# Patient Record
Sex: MALE | URBAN - METROPOLITAN AREA
[De-identification: names, ages, dates, MRNs, and addresses within clinical notes are randomized per-mention and may not be internally consistent; named-entity substitution may affect disease eponyms.]

---

## 2023-10-19 ENCOUNTER — TELEPHONE (OUTPATIENT)
Dept: CARDIOLOGY CLINIC | Age: 58
End: 2023-10-19

## 2023-10-19 DIAGNOSIS — I42.1 HOCM (HYPERTROPHIC OBSTRUCTIVE CARDIOMYOPATHY) (HCC): Primary | ICD-10-CM

## 2023-10-19 DIAGNOSIS — Z82.49 FAMILY HISTORY OF HYPERTROPHIC CARDIOMYOPATHY: ICD-10-CM

## 2023-10-19 NOTE — TELEPHONE ENCOUNTER
- please call patient to schedule with [facility] for [facility] appt in January with Echo same day or prior to. Echo order placed. Thank you!           ----- Message from Ryan Lopez MD sent at 10/19/2023  8:29 AM EDT -----  Regarding: new patient - FH HCM  I just diagnosed this adrien's mom with hypertrophic CM yesterday, so he needs an appt with me with echo and EKG to screen him for HCM.     Thanks    Dane Aguilar

## 2023-11-17 ENCOUNTER — APPOINTMENT (OUTPATIENT)
Dept: CT IMAGING | Age: 58
End: 2023-11-17
Payer: COMMERCIAL

## 2023-11-17 ENCOUNTER — HOSPITAL ENCOUNTER (EMERGENCY)
Age: 58
Discharge: HOME | End: 2023-11-17
Payer: COMMERCIAL

## 2023-11-17 ENCOUNTER — APPOINTMENT (OUTPATIENT)
Dept: ULTRASOUND IMAGING | Age: 58
End: 2023-11-17
Payer: COMMERCIAL

## 2023-11-17 VITALS
DIASTOLIC BLOOD PRESSURE: 70 MMHG | RESPIRATION RATE: 16 BRPM | TEMPERATURE: 98.4 F | OXYGEN SATURATION: 96 % | SYSTOLIC BLOOD PRESSURE: 163 MMHG | HEART RATE: 98 BPM | WEIGHT: 299.3 LBS

## 2023-11-17 DIAGNOSIS — R91.1 PULMONARY NODULE LESS THAN 6 MM DETERMINED BY COMPUTED TOMOGRAPHY OF LUNG: ICD-10-CM

## 2023-11-17 DIAGNOSIS — N43.3 HYDROCELE OF TESTIS: Primary | ICD-10-CM

## 2023-11-17 DIAGNOSIS — N50.89 TESTICULAR SWELLING, RIGHT: ICD-10-CM

## 2023-11-17 LAB
ANION GAP SERPL CALCULATED.3IONS-SCNC: 13 MMOL/L (ref 3–16)
BACTERIA URNS QL MICRO: ABNORMAL /HPF
BASOPHILS # BLD: 0.1 K/UL (ref 0–0.2)
BASOPHILS NFR BLD: 0.5 %
BILIRUB UR QL STRIP.AUTO: NEGATIVE
BUN SERPL-MCNC: 16 MG/DL (ref 7–20)
CALCIUM SERPL-MCNC: 9.1 MG/DL (ref 8.3–10.6)
CHLORIDE SERPL-SCNC: 103 MMOL/L (ref 99–110)
CLARITY UR: CLEAR
CO2 SERPL-SCNC: 26 MMOL/L (ref 21–32)
COLOR UR: YELLOW
CREAT SERPL-MCNC: 1 MG/DL (ref 0.9–1.3)
DEPRECATED RDW RBC AUTO: 14.9 % (ref 12.4–15.4)
EOSINOPHIL # BLD: 0.1 K/UL (ref 0–0.6)
EOSINOPHIL NFR BLD: 0.5 %
EPI CELLS #/AREA URNS AUTO: 0 /HPF (ref 0–5)
GFR SERPLBLD CREATININE-BSD FMLA CKD-EPI: >60 ML/MIN/{1.73_M2}
GLUCOSE SERPL-MCNC: 111 MG/DL (ref 70–99)
GLUCOSE UR STRIP.AUTO-MCNC: NEGATIVE MG/DL
HCT VFR BLD AUTO: 42.7 % (ref 40.5–52.5)
HGB BLD-MCNC: 14 G/DL (ref 13.5–17.5)
HGB UR QL STRIP.AUTO: ABNORMAL
HYALINE CASTS #/AREA URNS AUTO: 0 /LPF (ref 0–8)
KETONES UR STRIP.AUTO-MCNC: NEGATIVE MG/DL
LEUKOCYTE ESTERASE UR QL STRIP.AUTO: NEGATIVE
LYMPHOCYTES # BLD: 4.8 K/UL (ref 1–5.1)
LYMPHOCYTES NFR BLD: 33.7 %
MAGNESIUM SERPL-MCNC: 2 MG/DL (ref 1.8–2.4)
MCH RBC QN AUTO: 28.8 PG (ref 26–34)
MCHC RBC AUTO-ENTMCNC: 32.8 G/DL (ref 31–36)
MCV RBC AUTO: 87.9 FL (ref 80–100)
MONOCYTES # BLD: 0.9 K/UL (ref 0–1.3)
MONOCYTES NFR BLD: 6.5 %
NEUTROPHILS # BLD: 8.5 K/UL (ref 1.7–7.7)
NEUTROPHILS NFR BLD: 58.8 %
NITRITE UR QL STRIP.AUTO: NEGATIVE
PH UR STRIP.AUTO: 7 [PH] (ref 5–8)
PLATELET # BLD AUTO: 265 K/UL (ref 135–450)
PMV BLD AUTO: 8.5 FL (ref 5–10.5)
POTASSIUM SERPL-SCNC: 3.1 MMOL/L (ref 3.5–5.1)
PROT UR STRIP.AUTO-MCNC: NEGATIVE MG/DL
RBC # BLD AUTO: 4.86 M/UL (ref 4.2–5.9)
RBC CLUMPS #/AREA URNS AUTO: 9 /HPF (ref 0–4)
SODIUM SERPL-SCNC: 142 MMOL/L (ref 136–145)
SP GR UR STRIP.AUTO: 1.01 (ref 1–1.03)
UA COMPLETE W REFLEX CULTURE PNL UR: ABNORMAL
UA DIPSTICK W REFLEX MICRO PNL UR: YES
URN SPEC COLLECT METH UR: ABNORMAL
UROBILINOGEN UR STRIP-ACNC: 1 E.U./DL
WBC # BLD AUTO: 14.4 K/UL (ref 4–11)
WBC #/AREA URNS AUTO: 0 /HPF (ref 0–5)

## 2023-11-17 PROCEDURE — 81001 URINALYSIS AUTO W/SCOPE: CPT

## 2023-11-17 PROCEDURE — 76870 US EXAM SCROTUM: CPT

## 2023-11-17 PROCEDURE — 99284 EMERGENCY DEPT VISIT MOD MDM: CPT

## 2023-11-17 PROCEDURE — 80048 BASIC METABOLIC PNL TOTAL CA: CPT

## 2023-11-17 PROCEDURE — 83735 ASSAY OF MAGNESIUM: CPT

## 2023-11-17 PROCEDURE — 87591 N.GONORRHOEAE DNA AMP PROB: CPT

## 2023-11-17 PROCEDURE — 85025 COMPLETE CBC W/AUTO DIFF WBC: CPT

## 2023-11-17 PROCEDURE — 87491 CHLMYD TRACH DNA AMP PROBE: CPT

## 2023-11-17 PROCEDURE — 74176 CT ABD & PELVIS W/O CONTRAST: CPT

## 2023-11-17 PROCEDURE — 93975 VASCULAR STUDY: CPT

## 2023-11-17 ASSESSMENT — ENCOUNTER SYMPTOMS
ABDOMINAL PAIN: 0
NAUSEA: 0
CONSTIPATION: 0
RHINORRHEA: 0
VOMITING: 0
DIARRHEA: 0
COUGH: 0
SHORTNESS OF BREATH: 0
BACK PAIN: 0
SORE THROAT: 0
EYE PAIN: 0

## 2023-11-17 ASSESSMENT — PAIN - FUNCTIONAL ASSESSMENT: PAIN_FUNCTIONAL_ASSESSMENT: NONE - DENIES PAIN

## 2023-11-17 NOTE — ED PROVIDER NOTES
[place]        Pt Name: Nicolas Thornton  MRN: [id number]  [address] [facility] [place] 1965  Date of evaluation: 11/17/2023  Provider: LANG Root  PCP: Socorro Morrissey MD  Note Started: 3:30 PM EST 11/17/23      JEANETTE. I have evaluated this patient. CHIEF COMPLAINT       Chief Complaint   Patient presents with    Testicle Swelling     Right side       HISTORY OF PRESENT ILLNESS: 1 or more Elements     History from : Patient    Limitations to history : None    Nicolas Thornton is a 63 y.o. male who presents to the emergency room due to severe swelling of the right testicle area has been present for over 6 months. Patient states that when he first noticed swelling he was working on a [employer] machine lifting weights when he felt swelling in his right testicle. Patient states that there has not been any significant pain just more swelling. He states today he noticed that there was more swelling so he decided to come to the emergency department. He has not taken anything for his pain as he does not believe it is painful enough for medication. He states that he has been having normal urinary output. Denies any hematuria or dysuria. He denies any abdominal pain nausea or vomiting. Nursing Notes were all reviewed and agreed with or any disagreements were addressed in the HPI. REVIEW OF SYSTEMS :      Review of Systems   Constitutional:  Negative for chills, diaphoresis and fever. HENT:  Negative for congestion, rhinorrhea and sore throat. Eyes:  Negative for pain and visual disturbance. Respiratory:  Negative for cough and shortness of breath. Cardiovascular:  Negative for chest pain and leg swelling. Gastrointestinal:  Negative for abdominal pain, constipation, diarrhea, nausea and vomiting. Genitourinary:  Negative for difficulty urinating, dysuria and frequency.         Testicular swelling   Musculoskeletal:  Negative for

## 2023-11-17 NOTE — DISCHARGE INSTRUCTIONS
Follow-up with urology due to hydrocele noted today    You can take Tylenol or ibuprofen for pain if needed    Return to the emergency room if you notice any worsening swelling, worsening pain, redness or difficulty urinating. Lung Nodules    Your x-ray or CT scan shows a nodule (\"spot\") on your lung. This will require follow-up for further evaluation. Please call your Primary Care Physician (PCP) if you have already established one and you confirmed your PCP during your ER visit today. If you do not have a PCP, please call the [phone] [place] [address] scheduling number to schedule your Emergency Department follow up visit. Please mention that you are scheduling an \"ER follow up visit\" for a lung nodule. Learning About Lung Nodules  What is a lung nodule? A lung nodule is a growth in the lung. A single nodule surrounded by lung tissue is called a solitary pulmonary nodule. A lung nodule might not cause any symptoms. Your doctor may have found one or more nodules on your lung when you were having a chest X-ray or CT scan. Or it may have been found during a lung cancer screening. A lung nodule may be caused by an old infection or cancer. It might also be a noncancerous growth. Lung nodules can cause a screening to give an abnormal result. Most nodules do not cause any harm. But without further tests, your doctor can't tell whether an abnormal finding is cancer, a harmless nodule, or something else. What can you expect when you have a lung nodule? Your doctor will look at several risk factors to see how likely it is that the nodule is cancer. He or she will look at: Whether you smoke or have ever smoked. Your age and your family's medical history. Whether you have ever had lung cancer. The size and shape of the nodule. Whether the nodule has changed in size. Your doctor may look at past chest X-rays or CT scans, if available, and compare them.  Or you may have a series of CT scans to see if the

## 2023-11-18 LAB
C TRACH DNA UR QL NAA+PROBE: NEGATIVE
N GONORRHOEA DNA UR QL NAA+PROBE: NEGATIVE